# Patient Record
Sex: FEMALE | NOT HISPANIC OR LATINO
[De-identification: names, ages, dates, MRNs, and addresses within clinical notes are randomized per-mention and may not be internally consistent; named-entity substitution may affect disease eponyms.]

---

## 2024-06-14 ENCOUNTER — DASHBOARD ENCOUNTERS (OUTPATIENT)
Age: 44
End: 2024-06-14

## 2024-06-14 ENCOUNTER — OFFICE VISIT (OUTPATIENT)
Dept: URBAN - METROPOLITAN AREA CLINIC 92 | Facility: CLINIC | Age: 44
End: 2024-06-14
Payer: COMMERCIAL

## 2024-06-14 VITALS
WEIGHT: 188 LBS | BODY MASS INDEX: 32.1 KG/M2 | SYSTOLIC BLOOD PRESSURE: 123 MMHG | HEIGHT: 64 IN | HEART RATE: 77 BPM | DIASTOLIC BLOOD PRESSURE: 75 MMHG | TEMPERATURE: 96.9 F

## 2024-06-14 DIAGNOSIS — Z12.11 COLON CANCER SCREENING: ICD-10-CM

## 2024-06-14 DIAGNOSIS — R13.19 DYSPHAGIA: ICD-10-CM

## 2024-06-14 DIAGNOSIS — K21.9 GASTROESOPHAGEAL REFLUX DISEASE, UNSPECIFIED WHETHER ESOPHAGITIS PRESENT: ICD-10-CM

## 2024-06-14 PROBLEM — 30233002: Status: ACTIVE | Noted: 2024-06-14

## 2024-06-14 PROBLEM — 235595009: Status: ACTIVE | Noted: 2024-06-14

## 2024-06-14 PROCEDURE — 99204 OFFICE O/P NEW MOD 45 MIN: CPT

## 2024-06-14 RX ORDER — SERTRALINE HYDROCHLORIDE 50 MG/1
1 TABLET TABLET, FILM COATED ORAL ONCE A DAY
Status: ACTIVE | COMMUNITY

## 2024-06-14 RX ORDER — DEXTROAMPHETAMINE SACCHARATE, AMPHETAMINE ASPARTATE, DEXTROAMPHETAMINE SULFATE, AND AMPHETAMINE SULFATE 5; 5; 5; 5 MG/1; MG/1; MG/1; MG/1
1 TABLET TABLET ORAL TWICE A DAY
Status: ACTIVE | COMMUNITY

## 2024-06-14 RX ORDER — PANTOPRAZOLE SODIUM 40 MG/1
1 TABLET TABLET, DELAYED RELEASE ORAL ONCE A DAY
Qty: 90 TABLET | Refills: 3 | OUTPATIENT
Start: 2024-06-14

## 2024-06-14 NOTE — HPI-TODAY'S VISIT:
Patient is a 43 year old female who presents for esophageal pain.   Patient reports earlier this week on Monday she developed esophageal pain suddenly while eating watermelon. Pain persisted then was subtle but returned yesterday.  Reports of mid chest pain radiating into her right shoulder. Denies dysphagia. Notes of throat irritation. Reports she has a history of chronic indigestion. She had an EGD in 2014 normal per patient. Denies nausea or vomiting. Denies constipation, diarrhea, hematochezia, or melena. Patient suffers with anxiety. She had a cardiac exam within the last year which was normal. Started Ozempic in October, for prediabetes, A1C now below 5. Denies NSAID use. Social alcohol use. Denies tobacco or illicit drug use.   Denies fmhx of GI cancers. Scheduled for a colonoscopy on 07/08/24 in [facility] with Dr. Arcos. Patient denies any cardiac, lung, or kidney issues. No pacemaker/defibrillator, No blood thinner, No home O2. No dialysis.

## 2024-07-08 ENCOUNTER — CLAIMS CREATED FROM THE CLAIM WINDOW (OUTPATIENT)
Dept: URBAN - METROPOLITAN AREA SURGERY CENTER 16 | Facility: SURGERY CENTER | Age: 44
End: 2024-07-08

## 2024-07-08 ENCOUNTER — CLAIMS CREATED FROM THE CLAIM WINDOW (OUTPATIENT)
Dept: URBAN - METROPOLITAN AREA SURGERY CENTER 16 | Facility: SURGERY CENTER | Age: 44
End: 2024-07-08
Payer: COMMERCIAL

## 2024-07-08 ENCOUNTER — CLAIMS CREATED FROM THE CLAIM WINDOW (OUTPATIENT)
Dept: URBAN - METROPOLITAN AREA CLINIC 4 | Facility: CLINIC | Age: 44
End: 2024-07-08
Payer: COMMERCIAL

## 2024-07-08 DIAGNOSIS — K57.30 DIVERTICULA, COLON: ICD-10-CM

## 2024-07-08 DIAGNOSIS — K21.9 GASTROESOPHAGEAL REFLUX DISEASE: ICD-10-CM

## 2024-07-08 DIAGNOSIS — R12 HEARTBURN: ICD-10-CM

## 2024-07-08 DIAGNOSIS — K31.89 OTHER DISEASES OF STOMACH AND DUODENUM: ICD-10-CM

## 2024-07-08 DIAGNOSIS — Z12.11 ENCOUNTER FOR SCREENING FOR MALIGNANT NEOPLASM OF COLON: ICD-10-CM

## 2024-07-08 DIAGNOSIS — Z12.11 COLON CANCER SCREENING (HIGH RISK): ICD-10-CM

## 2024-07-08 DIAGNOSIS — K21.9 GASTRIC REFLUX: ICD-10-CM

## 2024-07-08 DIAGNOSIS — K29.70 GASTRITIS, UNSPECIFIED, WITHOUT BLEEDING: ICD-10-CM

## 2024-07-08 DIAGNOSIS — R13.19 DYSPHAGIA: ICD-10-CM

## 2024-07-08 DIAGNOSIS — K21.9 GASTRO-ESOPHAGEAL REFLUX DISEASE WITHOUT ESOPHAGITIS: ICD-10-CM

## 2024-07-08 PROCEDURE — 00813 ANES UPR LWR GI NDSC PX: CPT | Performed by: ANESTHESIOLOGY

## 2024-07-08 PROCEDURE — 0528F RCMND FLW-UP 10 YRS DOCD: CPT | Performed by: INTERNAL MEDICINE

## 2024-07-08 PROCEDURE — G0121 COLON CA SCRN NOT HI RSK IND: HCPCS | Performed by: INTERNAL MEDICINE

## 2024-07-08 PROCEDURE — 43239 EGD BIOPSY SINGLE/MULTIPLE: CPT | Performed by: INTERNAL MEDICINE

## 2024-07-08 PROCEDURE — 00813 ANES UPR LWR GI NDSC PX: CPT

## 2024-07-08 PROCEDURE — 88305 TISSUE EXAM BY PATHOLOGIST: CPT | Performed by: PATHOLOGY

## 2024-07-08 PROCEDURE — 88312 SPECIAL STAINS GROUP 1: CPT | Performed by: PATHOLOGY

## 2024-07-08 RX ORDER — DEXTROAMPHETAMINE SACCHARATE, AMPHETAMINE ASPARTATE, DEXTROAMPHETAMINE SULFATE, AND AMPHETAMINE SULFATE 5; 5; 5; 5 MG/1; MG/1; MG/1; MG/1
1 TABLET TABLET ORAL TWICE A DAY
Status: ACTIVE | COMMUNITY

## 2024-07-08 RX ORDER — PANTOPRAZOLE SODIUM 40 MG/1
1 TABLET TABLET, DELAYED RELEASE ORAL ONCE A DAY
Qty: 90 TABLET | Refills: 3 | Status: ACTIVE | COMMUNITY
Start: 2024-06-14

## 2024-07-08 RX ORDER — SERTRALINE HYDROCHLORIDE 50 MG/1
1 TABLET TABLET, FILM COATED ORAL ONCE A DAY
Status: ACTIVE | COMMUNITY

## 2024-07-29 ENCOUNTER — OFFICE VISIT (OUTPATIENT)
Dept: URBAN - METROPOLITAN AREA TELEHEALTH 2 | Facility: TELEHEALTH | Age: 44
End: 2024-07-29

## 2024-07-29 RX ORDER — PANTOPRAZOLE SODIUM 40 MG/1
1 TABLET TABLET, DELAYED RELEASE ORAL ONCE A DAY
Qty: 90 TABLET | Refills: 3 | Status: ACTIVE | COMMUNITY
Start: 2024-06-14

## 2024-07-29 RX ORDER — DEXTROAMPHETAMINE SACCHARATE, AMPHETAMINE ASPARTATE, DEXTROAMPHETAMINE SULFATE, AND AMPHETAMINE SULFATE 5; 5; 5; 5 MG/1; MG/1; MG/1; MG/1
1 TABLET TABLET ORAL TWICE A DAY
Status: ACTIVE | COMMUNITY

## 2024-07-29 RX ORDER — SERTRALINE HYDROCHLORIDE 50 MG/1
1 TABLET TABLET, FILM COATED ORAL ONCE A DAY
Status: ACTIVE | COMMUNITY

## 2024-07-29 NOTE — HPI-TODAY'S VISIT:
Patient is a 43 year old female who presents in follow up from her EGD/Colon  Initial visit, Patient reports earlier this week on Monday she developed esophageal pain suddenly while eating watermelon. Pain persisted then was subtle but returned yesterday.  Reports of mid chest pain radiating into her right shoulder. Denies dysphagia. Notes of throat irritation. Reports she has a history of chronic indigestion. She had an EGD in 2014 normal per patient. Denies nausea or vomiting. Denies constipation, diarrhea, hematochezia, or melena. Patient suffers with anxiety. She had a cardiac exam within the last year which was normal. Started Ozempic in October, for prediabetes, A1C now below 5. Denies NSAID use. Social alcohol use. Denies tobacco or illicit drug use.   Colonoscopy 7/08/24 with Dr. Garcia revealed non-thrombosed IH, diverticulosis in left colon otherwise normal, no specimens collected, 10 yr repeat. EGD 7/08/24 revealed normal esophagus, stomach, and duodenum, antral bx reveal gastropathy, neg. for H. pylori or IM, distal esophageal bx reveal reflux type changes, neg. for Jimenez's or EOE. Denies fmhx of GI cancers.  Today, via telehealth